# Patient Record
(demographics unavailable — no encounter records)

---

## 2025-03-13 NOTE — HISTORY OF PRESENT ILLNESS
[FreeTextEntry1] : TBSE  [de-identified] : 37yo M here for:   #TBSE: no new or concerning moles today  Personal hx of skin cancer: no FHx of skin cancer: no

## 2025-03-13 NOTE — PHYSICAL EXAM
[Alert] : alert [Oriented x 3] : ~L oriented x 3 [Well Nourished] : well nourished [Full Body Skin Exam Performed] : performed [FreeTextEntry3] :  alert,  oriented x 3, well nourished, conjunctiva non-injected, no visual lymphadenopathy, no clubbing, no edema, no bromhidrosis and no chromhidrosis.   Full body skin exam was performed.   General: Alert and oriented, in NAD. All of the following were examined and were within normal limits, except as noted: Scalp: Face, including eyelids, nose, lips, ears, oropharynx: Neck: Chest/Back/Abdomen: Bilateral Arms/Hands: Bilateral Legs/Feet: Buttocks, Genitalia, Anus/perineum:   Hair, Nails, Oral Mucosa, Eyes: - benign nevi trunk  - angiomas  - lentigines  - cyst right upper inner thigh

## 2025-03-13 NOTE — ASSESSMENT
[FreeTextEntry1] : # Cyst  - education & reassurance   #  Multiple melanocytic nevi, benign - Monitor moles for changes - Recommend wearing hats and sun protective clothing or OTC sunscreen products (SPF 30+, broad band, EltaMD/La Roche Posay/Neutrogena) daily on your face and entire body (apply sunscreen atleast 30 minutes prior to going outside). Reapply sunscreen every 2 hours when outside.  # Solar lentigines - Discussed etiology and benign nature of condition - Sun protective measures reinforced. Recommended OTC sunscreen products, including SPF30+ with broadband UV protection as well as proper use.  #  Screening exam for skin cancer- TBSE performed today - Advised sun protection. Recommended OTC sunscreen products (EltaMD/Neutrogena/La Roche Posay), including SPF30+ with broadband UV protection as well as proper use. Discussed OTC sun protective clothing - Counseled patient to monitor for changes, including mole monitoring and self-skin exams.  RTC 1 year or sooner PRN